# Patient Record
Sex: FEMALE | Race: WHITE | NOT HISPANIC OR LATINO | URBAN - METROPOLITAN AREA
[De-identification: names, ages, dates, MRNs, and addresses within clinical notes are randomized per-mention and may not be internally consistent; named-entity substitution may affect disease eponyms.]

---

## 2023-12-09 ENCOUNTER — HOSPITAL ENCOUNTER (EMERGENCY)
Facility: HOSPITAL | Age: 53
Discharge: HOME | End: 2023-12-09
Attending: EMERGENCY MEDICINE
Payer: COMMERCIAL

## 2023-12-09 ENCOUNTER — APPOINTMENT (EMERGENCY)
Dept: RADIOLOGY | Facility: HOSPITAL | Age: 53
End: 2023-12-09
Payer: COMMERCIAL

## 2023-12-09 VITALS
SYSTOLIC BLOOD PRESSURE: 122 MMHG | HEART RATE: 66 BPM | TEMPERATURE: 98.2 F | DIASTOLIC BLOOD PRESSURE: 60 MMHG | OXYGEN SATURATION: 100 % | WEIGHT: 147.27 LBS | RESPIRATION RATE: 20 BRPM

## 2023-12-09 DIAGNOSIS — S82.892A CLOSED FRACTURE OF LEFT ANKLE, INITIAL ENCOUNTER: Primary | ICD-10-CM

## 2023-12-09 PROCEDURE — 99284 EMERGENCY DEPT VISIT MOD MDM: CPT | Performed by: EMERGENCY MEDICINE

## 2023-12-09 PROCEDURE — 73610 X-RAY EXAM OF ANKLE: CPT

## 2023-12-09 PROCEDURE — 99283 EMERGENCY DEPT VISIT LOW MDM: CPT

## 2023-12-09 NOTE — Clinical Note
Winter Linda was seen and treated in our emergency department on 12/9/2023. No work until cleared by Family Doctor/Orthopedics        Diagnosis: Ankle Fracture    Jodi  . She may return on this date: If you have any questions or concerns, please don't hesitate to call.       Ivy Redmond, DO    ______________________________           _______________          _______________  Hospital Representative                              Date                                Time

## 2023-12-09 NOTE — ED PROVIDER NOTES
History  Chief Complaint   Patient presents with    Ankle Injury     L ankle twisted at home     Patient presents for evaluation of left ankle injury. Patient rolled her left ankle denies any other injury. History provided by:  Patient   used: No    Ankle Injury      None       History reviewed. No pertinent past medical history. Past Surgical History:   Procedure Laterality Date    OVARY SURGERY         History reviewed. No pertinent family history. I have reviewed and agree with the history as documented. E-Cigarette/Vaping    E-Cigarette Use Never User      E-Cigarette/Vaping Substances     Social History     Tobacco Use    Smoking status: Never    Smokeless tobacco: Never   Vaping Use    Vaping Use: Never used   Substance Use Topics    Alcohol use: Not Currently    Drug use: Never       Review of Systems   All other systems reviewed and are negative. Physical Exam  Physical Exam  Vitals and nursing note reviewed. Constitutional:       General: She is not in acute distress. Musculoskeletal:         General: Swelling and tenderness present. No deformity. Feet:    Skin:     Capillary Refill: Capillary refill takes less than 2 seconds. Findings: Bruising present. No rash. Neurological:      General: No focal deficit present. Mental Status: She is alert and oriented to person, place, and time.          Vital Signs  ED Triage Vitals [12/09/23 1344]   Temperature Pulse Respirations Blood Pressure SpO2   98.2 °F (36.8 °C) 66 20 122/60 100 %      Temp Source Heart Rate Source Patient Position - Orthostatic VS BP Location FiO2 (%)   Tympanic Monitor Sitting Left arm --      Pain Score       2           Vitals:    12/09/23 1344   BP: 122/60   Pulse: 66   Patient Position - Orthostatic VS: Sitting         Visual Acuity      ED Medications  Medications - No data to display    Diagnostic Studies  Results Reviewed       None                   XR ankle 3+ views LEFT (Results Pending)              Procedures  Orthopedic injury treatment    Date/Time: 12/9/2023 2:17 PM    Performed by: Kamaljit French DO  Authorized by: Kamaljit French DO  Universal Protocol:  Procedure performed by: (RN)  Consent: Verbal consent obtained. Consent given by: patient  Required items: required blood products, implants, devices, and special equipment available  Patient identity confirmed: verbally with patient and arm band    Injury location:  Ankle  Location details:  Left ankle  Injury type:  Fracture  Fracture type: lateral malleolus    Fracture type: lateral malleolus    Neurovascular status: Neurovascularly intact    Distal perfusion: normal    Neurological function: normal    Range of motion: reduced    Supplies used: Cam boot. Neurovascular status: Neurovascularly intact    Distal perfusion: normal    Neurological function: normal    Range of motion: unchanged    Patient tolerance:  Patient tolerated the procedure well with no immediate complications           ED Course                                             Medical Decision Making  Pulse ox 100% on room air indicating adequate oxygenation. Xray L ankle: Nondisplaced fracture through the lateral malleolus as read by me          Amount and/or Complexity of Data Reviewed  Radiology: ordered. Disposition  Final diagnoses:   Closed fracture of left ankle, initial encounter     Time reflects when diagnosis was documented in both MDM as applicable and the Disposition within this note       Time User Action Codes Description Comment    12/9/2023  2:10 PM Kamaljit French Add [E07.998S] Closed fracture of left ankle, initial encounter           ED Disposition       ED Disposition   Discharge    Condition   Stable    Date/Time   Sat Dec 9, 2023  2:10 PM    Comment   Gaylia Angelucci Dizon discharge to home/self care. Follow-up Information    None         There are no discharge medications for this patient.           PDMP Review None            ED Provider  Electronically Signed by             Kamaljit French DO  12/09/23 2247

## 2023-12-12 ENCOUNTER — OFFICE VISIT (OUTPATIENT)
Dept: OBGYN CLINIC | Facility: CLINIC | Age: 53
End: 2023-12-12
Payer: COMMERCIAL

## 2023-12-12 VITALS
WEIGHT: 147 LBS | HEART RATE: 64 BPM | HEIGHT: 68 IN | DIASTOLIC BLOOD PRESSURE: 73 MMHG | BODY MASS INDEX: 22.28 KG/M2 | SYSTOLIC BLOOD PRESSURE: 111 MMHG

## 2023-12-12 DIAGNOSIS — S82.832A CLOSED FRACTURE OF DISTAL END OF LEFT FIBULA, UNSPECIFIED FRACTURE MORPHOLOGY, INITIAL ENCOUNTER: ICD-10-CM

## 2023-12-12 PROCEDURE — 27786 TREATMENT OF ANKLE FRACTURE: CPT | Performed by: ORTHOPAEDIC SURGERY

## 2023-12-12 PROCEDURE — 99203 OFFICE O/P NEW LOW 30 MIN: CPT | Performed by: ORTHOPAEDIC SURGERY

## 2023-12-12 NOTE — PROGRESS NOTES
Assessment/Plan:  1. Closed fracture of distal end of left fibula, unspecified fracture morphology, initial encounter  Ambulatory Referral to Orthopedic Surgery    Fracture / Dislocation Treatment        Scribe Attestation    I,:  Stacy Heath am acting as a scribe while in the presence of the attending physician.:       I,:  Roland Caputo MD personally performed the services described in this documentation    as scribed in my presence.:           [facility] is a pleasant 46year-old female who presents for initial evaluation of the left ankle. She has a left Pelaez A fibula fracture which appears to be stable. We had a discussion about treatment options. This appears to be a stable ankle fracture. At this time we will continue with conservative nonoperative treatment with closed treatment of the distal fibula fracture. Kolton Shah She will WBAT with use of the CAM boot and crutches for added support and balance. She may wean out of the crutches as tolerated. . She should remain out of work for the next 2 weeks. A note was provided to her today. She will follow-up in 2 weeks for re-evaluation.     Fracture / Dislocation Treatment    Date/Time: 12/12/2023 1:00 PM    Performed by: Roland Caputo MD  Authorized by: Roland Caputo MD    Patient Location:  [facility]  [place] Protocol:  Timeout called at: 12/12/2023 2:00 PM.    Injury location:  Ankle  Location details:  Left ankle  Injury type:  Fracture  Fracture type: lateral malleolus    Fracture type: lateral malleolus    Neurovascular status: Neurovascularly intact    Distal perfusion: normal    Neurological function: normal    Range of motion: reduced    Manipulation performed?: No    Immobilization:  Crutches and brace  Neurovascular status: Neurovascularly intact    Distal perfusion: normal    Neurological function: normal    Range of motion: unchanged    Patient tolerance:  Patient tolerated the procedure well with no immediate complications        Subjective: Laura Gonzalez is a 46 y.o. female who presents for initial evaluation of the left ankle. She was seen in the ED on 12/9/2023 after twisting her ankle at home and heard a "pop". X-rays of the left ankle were obtained and demonstrated a distal fibular fracture. She presents to the office today using a CAM boot and crutches for ambulatory assistance. Today, she reports the ankle is feeling good. She notes the ankle is swollen and bruised. She notes the selling increased when she was icing the ankle. She has been applying compression and elevating the foot and ankle, with relief. Review of Systems   Constitutional:  Positive for activity change. Negative for chills and fever. HENT:  Negative for ear pain and sore throat. Eyes:  Negative for pain and visual disturbance. Respiratory:  Negative for cough and shortness of breath. Cardiovascular:  Negative for chest pain and palpitations. Gastrointestinal:  Negative for abdominal pain and vomiting. Genitourinary:  Negative for dysuria and hematuria. Musculoskeletal:  Positive for arthralgias, joint swelling and myalgias. Negative for back pain. Skin:  Negative for color change and rash. Neurological:  Negative for seizures and syncope. All other systems reviewed and are negative. History reviewed. No pertinent past medical history. Past Surgical History:   Procedure Laterality Date   • OVARY SURGERY         History reviewed. No pertinent family history. Social History     Occupational History   • Not on file   Tobacco Use   • Smoking status: Never   • Smokeless tobacco: Never   Vaping Use   • Vaping status: Never Used   Substance and Sexual Activity   • Alcohol use: Not Currently   • Drug use: Never   • Sexual activity: Not on file       No current outpatient medications on file.     Allergies   Allergen Reactions   • Doxycycline Hives   • Shellfish-Derived Products - Food Allergy Other (See Comments)             Objective:  Vitals: 12/12/23 1256   BP: 111/73   Pulse: 64       Left Ankle Exam     Tenderness   The patient is experiencing tenderness in the lateral malleolus. Swelling: mild    Range of Motion   Left ankle dorsiflexion: limited due to swelling. Other   Erythema: absent  Scars: absent  Sensation: normal  Pulse: present    Comments:  Significant ecchymosis          Observations   Left Ankle/Foot   Negative for adhesive scar. Physical Exam  Vitals and nursing note reviewed. Constitutional:       Appearance: Normal appearance. HENT:      Head: Normocephalic and atraumatic. Right Ear: External ear normal.      Left Ear: External ear normal.      Nose: Nose normal.   Eyes:      General: No scleral icterus. Extraocular Movements: Extraocular movements intact. Conjunctiva/sclera: Conjunctivae normal.   Cardiovascular:      Rate and Rhythm: Normal rate. Pulmonary:      Effort: Pulmonary effort is normal. No respiratory distress. Musculoskeletal:         General: Swelling and signs of injury present. Cervical back: Normal range of motion and neck supple. Comments: See ortho exam   Skin:     General: Skin is warm and dry. Findings: Bruising present. Neurological:      Mental Status: She is alert and oriented to person, place, and time. Psychiatric:         Mood and Affect: Mood normal.         Behavior: Behavior normal.         I have personally reviewed pertinent films in PACS and my interpretation is as follows:  X-rays of the left ankle obtained on 12/9/2023 demonstrate a fracture of the distal fibula, below the syndesmosis, nondisplaced. This document was created using speech voice recognition software. Grammatical errors, random word insertions, pronoun errors, and incomplete sentences are an occasional consequence of this system due to software limitations, ambient noise, and hardware issues.    Any formal questions or concerns about content, text, or information contained within the body of this dictation should be directly addressed to the provider for clarification.

## 2023-12-26 ENCOUNTER — OFFICE VISIT (OUTPATIENT)
Dept: OBGYN CLINIC | Facility: CLINIC | Age: 53
End: 2023-12-26

## 2023-12-26 ENCOUNTER — APPOINTMENT (OUTPATIENT)
Dept: RADIOLOGY | Facility: CLINIC | Age: 53
End: 2023-12-26
Payer: COMMERCIAL

## 2023-12-26 VITALS
BODY MASS INDEX: 22.28 KG/M2 | WEIGHT: 147 LBS | SYSTOLIC BLOOD PRESSURE: 115 MMHG | HEART RATE: 68 BPM | HEIGHT: 68 IN | DIASTOLIC BLOOD PRESSURE: 73 MMHG

## 2023-12-26 DIAGNOSIS — S82.832A CLOSED FRACTURE OF DISTAL END OF LEFT FIBULA, UNSPECIFIED FRACTURE MORPHOLOGY, INITIAL ENCOUNTER: ICD-10-CM

## 2023-12-26 DIAGNOSIS — S82.832A CLOSED FRACTURE OF DISTAL END OF LEFT FIBULA, UNSPECIFIED FRACTURE MORPHOLOGY, INITIAL ENCOUNTER: Primary | ICD-10-CM

## 2023-12-26 PROCEDURE — 73610 X-RAY EXAM OF ANKLE: CPT

## 2023-12-26 PROCEDURE — 99024 POSTOP FOLLOW-UP VISIT: CPT | Performed by: ORTHOPAEDIC SURGERY

## 2023-12-26 NOTE — LETTER
December 26, 2023     Patient: Jodi Salcedo  YOB: 1970  Date of Visit: 12/26/2023      To Whom it May Concern:    Jodi Salcedo is under my professional care. Jodi was seen in my office on 12/26/2023. Jodi may not participate in work for an additional 2 weeks (1/9/23). We will reevaluate in 2 weeks.     If you have any questions or concerns, please don't hesitate to call.         Sincerely,          Benjamín Ponce MD        CC: No Recipients

## 2023-12-26 NOTE — PROGRESS NOTES
"Assessment/Plan:  1. Closed fracture of distal end of left fibula, unspecified fracture morphology, initial encounter  XR ankle 3+ vw left    Ankle Cude ankle/Ankle Brace        Scribe Attestation    I,:  Greg Alonzo am acting as a scribe while in the presence of the attending physician.:       I,:  Benjamín Ponce MD personally performed the services described in this documentation    as scribed in my presence.:           Jodi is a pleasant 53-year-old female here for follow-up evaluation of left Pelaez A fibula fracture.  New x-rays were obtained and reviewed with patient at today's visit which demonstrates appropriate signs of healing, no interval displacement.  I discussed with patient that at this point time she may continue to use the cam boot and crutches and she may start to wean off of these over the next 2 weeks.  I advised patient she may start to be full weightbearing on left lower extremity with Cam boot.  Lace up ankle brace was given to patient today for her to use 2 weeks after cam boot usage.  I educated patient that she may remove cam boot for hygiene and sleep.  She should remain out of work for the next 2 weeks.  A note was provided to her today.  She will follow-up in 2 weeks for reevaluation.      Subjective:   Jodi Salcedo is a 53 y.o. female who presents for follow-up evaluation left Pelaez A fibula fracture.  Patient was last seen 12/12/2023 where patient was to be weightbearing as tolerated with use of cam boot and crutches.  Patient states today she continues to get better and her swelling has decreased.  However patient states she occasionally gets pain with certain movements and has been using the crutches for ambulatory assistance.  Patient states she has continued been compliant with cam boot and crutches since last visit.  Patient is overall pleased with her progress.    Previous history: She was seen in the ED on 12/9/2023 after twisting her ankle at home and heard a \"pop\". " X-rays of the left ankle were obtained and demonstrated a distal fibular fracture. She presents to the office today using a CAM boot and crutches for ambulatory assistance. She reports the ankle is feeling good. She notes the ankle is swollen and bruised. She notes the selling increased when she was icing the ankle. She has been applying compression and elevating the foot and ankle, with relief.       Review of Systems   Constitutional:  Positive for activity change. Negative for chills, fever and unexpected weight change.   HENT:  Negative for hearing loss, nosebleeds and sore throat.    Eyes:  Negative for pain, redness and visual disturbance.   Respiratory:  Negative for cough, shortness of breath and wheezing.    Cardiovascular:  Negative for chest pain, palpitations and leg swelling.   Gastrointestinal:  Negative for abdominal pain, nausea and vomiting.   Endocrine: Negative for polydipsia and polyuria.   Genitourinary:  Negative for dysuria and hematuria.   Musculoskeletal:  Positive for arthralgias.        See HPI   Skin:  Negative for rash and wound.   Neurological:  Negative for dizziness, numbness and headaches.   Psychiatric/Behavioral:  Negative for decreased concentration and suicidal ideas. The patient is not nervous/anxious.          History reviewed. No pertinent past medical history.    Past Surgical History:   Procedure Laterality Date   • OVARY SURGERY         History reviewed. No pertinent family history.    Social History     Occupational History   • Not on file   Tobacco Use   • Smoking status: Never   • Smokeless tobacco: Never   Vaping Use   • Vaping status: Never Used   Substance and Sexual Activity   • Alcohol use: Not Currently   • Drug use: Never   • Sexual activity: Not on file       No current outpatient medications on file.    Allergies   Allergen Reactions   • Doxycycline Hives   • Shellfish-Derived Products - Food Allergy Other (See Comments)             Objective:  Vitals:     12/26/23 1259   BP: 115/73   Pulse: 68       Left Ankle Exam     Tenderness   The patient is experiencing tenderness in the lateral malleolus.   Swelling: mild    Other   Erythema: absent  Scars: absent  Sensation: normal  Pulse: present    Comments:  Mild ecchymosis          Observations   Left Ankle/Foot   Negative for adhesive scar.       Physical Exam  Vitals and nursing note reviewed.   Constitutional:       Appearance: Normal appearance. She is well-developed.   HENT:      Head: Normocephalic and atraumatic.      Right Ear: External ear normal.      Left Ear: External ear normal.   Eyes:      General: No scleral icterus.     Extraocular Movements: Extraocular movements intact.      Conjunctiva/sclera: Conjunctivae normal.   Cardiovascular:      Rate and Rhythm: Normal rate.   Pulmonary:      Effort: Pulmonary effort is normal. No respiratory distress.   Musculoskeletal:      Cervical back: Normal range of motion and neck supple.      Comments: See Ortho exam   Skin:     General: Skin is warm and dry.   Neurological:      General: No focal deficit present.      Mental Status: She is alert and oriented to person, place, and time.   Psychiatric:         Behavior: Behavior normal.         I have personally reviewed pertinent films in PACS and my interpretation is as follows:  X-rays of the left ankle obtained on 12/26/2023 demonstrate a fracture of the distal fibula, below the syndesmosis, nondisplaced with appropriate healing.      This document was created using speech voice recognition software.   Grammatical errors, random word insertions, pronoun errors, and incomplete sentences are an occasional consequence of this system due to software limitations, ambient noise, and hardware issues.   Any formal questions or concerns about content, text, or information contained within the body of this dictation should be directly addressed to the provider for clarification.

## 2023-12-26 NOTE — LETTER
December 26, 2023     Patient: Jodi Salcedo  YOB: 1970  Date of Visit: 12/26/2023      To Whom it May Concern:    Jodi Salcedo is under my professional care. Jodi was seen in my office on 12/26/2023. Jodi may not participate in work for an additional 2 weeks (1/9/24). We will reevaluate in 2 weeks.     If you have any questions or concerns, please don't hesitate to call.         Sincerely,          Benjamín Ponce MD        CC: No Recipients

## 2024-01-09 ENCOUNTER — APPOINTMENT (OUTPATIENT)
Dept: RADIOLOGY | Facility: CLINIC | Age: 54
End: 2024-01-09
Payer: COMMERCIAL

## 2024-01-09 ENCOUNTER — OFFICE VISIT (OUTPATIENT)
Dept: OBGYN CLINIC | Facility: CLINIC | Age: 54
End: 2024-01-09

## 2024-01-09 VITALS
BODY MASS INDEX: 22.28 KG/M2 | HEART RATE: 67 BPM | SYSTOLIC BLOOD PRESSURE: 124 MMHG | WEIGHT: 147 LBS | DIASTOLIC BLOOD PRESSURE: 79 MMHG | HEIGHT: 68 IN

## 2024-01-09 DIAGNOSIS — S82.832A CLOSED FRACTURE OF DISTAL END OF LEFT FIBULA, UNSPECIFIED FRACTURE MORPHOLOGY, INITIAL ENCOUNTER: ICD-10-CM

## 2024-01-09 PROCEDURE — 73610 X-RAY EXAM OF ANKLE: CPT

## 2024-01-09 PROCEDURE — 99024 POSTOP FOLLOW-UP VISIT: CPT | Performed by: ORTHOPAEDIC SURGERY

## 2024-01-09 NOTE — LETTER
January 9, 2024     Patient: Jodi Salcedo  YOB: 1970  Date of Visit: 1/9/2024      To Whom it May Concern:    Jodi Salcedo is under my professional care. Jodi was seen in my office on 1/9/2024. Jodi can return to work on 1/22/2024.    If you have any questions or concerns, please don't hesitate to call.         Sincerely,          Benjamín Ponce MD        CC: No Recipients

## 2024-01-10 NOTE — PROGRESS NOTES
"Assessment/Plan:  1. Closed fracture of distal end of left fibula, unspecified fracture morphology, initial encounter  XR ankle 3+ vw left        Scribe Attestation    I,:   am acting as a scribe while in the presence of the attending physician.:       I,:   personally performed the services described in this documentation    as scribed in my presence.:           Jodi is a pleasant 53-year-old female here for follow-up evaluation of left Pelaez A fibula fracture.  New x-rays were obtained and reviewed with patient at today's visit which demonstrates appropriate signs of healing, no interval displacement.  She is now 4 weeks out from her injury.  She has transition to a shoe and ankle brace.  She still has a limp.  Will plan to see her back in about 2 weeks time.  She should remain out of work at this time.  Reevaluate in 2 weeks.  Continue to weight-bear as tolerated.      Subjective:   Today 1/9/2024: Her pain is well-controlled.  She is transition to a regular shoe and ankle brace.  She is still walking with a slight limp.    12/26/23: Jodi Salcedo is a 53 y.o. female who presents for follow-up evaluation left Pelaez A fibula fracture.  Patient was last seen 12/12/2023 where patient was to be weightbearing as tolerated with use of cam boot and crutches.  Patient states today she continues to get better and her swelling has decreased.  However patient states she occasionally gets pain with certain movements and has been using the crutches for ambulatory assistance.  Patient states she has continued been compliant with cam boot and crutches since last visit.  Patient is overall pleased with her progress.    Previous history: She was seen in the ED on 12/9/2023 after twisting her ankle at home and heard a \"pop\". X-rays of the left ankle were obtained and demonstrated a distal fibular fracture. She presents to the office today using a CAM boot and crutches for ambulatory assistance. She reports the ankle is feeling " good. She notes the ankle is swollen and bruised. She notes the selling increased when she was icing the ankle. She has been applying compression and elevating the foot and ankle, with relief.       Review of Systems   Constitutional:  Positive for activity change. Negative for chills, fever and unexpected weight change.   HENT:  Negative for hearing loss, nosebleeds and sore throat.    Eyes:  Negative for pain, redness and visual disturbance.   Respiratory:  Negative for cough, shortness of breath and wheezing.    Cardiovascular:  Negative for chest pain, palpitations and leg swelling.   Gastrointestinal:  Negative for abdominal pain, nausea and vomiting.   Endocrine: Negative for polydipsia and polyuria.   Genitourinary:  Negative for dysuria and hematuria.   Musculoskeletal:  Positive for arthralgias.        See HPI   Skin:  Negative for rash and wound.   Neurological:  Negative for dizziness, numbness and headaches.   Psychiatric/Behavioral:  Negative for decreased concentration and suicidal ideas. The patient is not nervous/anxious.          History reviewed. No pertinent past medical history.    Past Surgical History:   Procedure Laterality Date   • OVARY SURGERY         History reviewed. No pertinent family history.    Social History     Occupational History   • Not on file   Tobacco Use   • Smoking status: Never   • Smokeless tobacco: Never   Vaping Use   • Vaping status: Never Used   Substance and Sexual Activity   • Alcohol use: Not Currently   • Drug use: Never   • Sexual activity: Not on file       No current outpatient medications on file.    Allergies   Allergen Reactions   • Doxycycline Hives   • Shellfish-Derived Products - Food Allergy Other (See Comments)             Objective:  Vitals:    01/09/24 1033   BP: 124/79   Pulse: 67       Left Ankle Exam     Tenderness   The patient is experiencing tenderness in the lateral malleolus.   Swelling: mild    Other   Erythema: absent  Scars:  absent  Sensation: normal  Pulse: present    Comments:  Mild ecchymosis          Observations   Left Ankle/Foot   Negative for adhesive scar.       Physical Exam  Vitals and nursing note reviewed.   Constitutional:       Appearance: Normal appearance. She is well-developed.   HENT:      Head: Normocephalic and atraumatic.      Right Ear: External ear normal.      Left Ear: External ear normal.   Eyes:      General: No scleral icterus.     Extraocular Movements: Extraocular movements intact.      Conjunctiva/sclera: Conjunctivae normal.   Cardiovascular:      Rate and Rhythm: Normal rate.   Pulmonary:      Effort: Pulmonary effort is normal. No respiratory distress.   Musculoskeletal:      Cervical back: Normal range of motion and neck supple.      Comments: See Ortho exam   Skin:     General: Skin is warm and dry.   Neurological:      General: No focal deficit present.      Mental Status: She is alert and oriented to person, place, and time.   Psychiatric:         Behavior: Behavior normal.         I have personally reviewed pertinent films in PACS and my interpretation is as follows:  X-rays of the left ankle obtained on 1/9/2024 demonstrate a fracture of the distal fibula, below the syndesmosis, nondisplaced interval signs of healing since last visit      This document was created using speech voice recognition software.   Grammatical errors, random word insertions, pronoun errors, and incomplete sentences are an occasional consequence of this system due to software limitations, ambient noise, and hardware issues.   Any formal questions or concerns about content, text, or information contained within the body of this dictation should be directly addressed to the provider for clarification.

## 2024-01-16 ENCOUNTER — TELEPHONE (OUTPATIENT)
Age: 54
End: 2024-01-16

## 2024-01-16 NOTE — TELEPHONE ENCOUNTER
Caller: Brayan    Doctor/Office: Rosario    CB#: [phone]    Work or school note: The need an updated return to work note that states patient is able to return to work with no restrictions full duty     Return to work/school date:  1/22/2024    Fax #: [phone]    Is there any restrictions that need to be updated? If so, what? NONE

## 2024-04-20 ENCOUNTER — OFFICE VISIT (OUTPATIENT)
Dept: URGENT CARE | Facility: CLINIC | Age: 54
End: 2024-04-20
Payer: COMMERCIAL

## 2024-04-20 VITALS
TEMPERATURE: 98.2 F | SYSTOLIC BLOOD PRESSURE: 119 MMHG | HEART RATE: 71 BPM | OXYGEN SATURATION: 98 % | RESPIRATION RATE: 16 BRPM | BODY MASS INDEX: 23.26 KG/M2 | DIASTOLIC BLOOD PRESSURE: 73 MMHG | WEIGHT: 153 LBS

## 2024-04-20 DIAGNOSIS — J06.9 VIRAL URI: Primary | ICD-10-CM

## 2024-04-20 PROCEDURE — 99213 OFFICE O/P EST LOW 20 MIN: CPT | Performed by: FAMILY MEDICINE

## 2024-04-20 RX ORDER — BROMPHENIRAMINE MALEATE, PSEUDOEPHEDRINE HYDROCHLORIDE, AND DEXTROMETHORPHAN HYDROBROMIDE 2; 30; 10 MG/5ML; MG/5ML; MG/5ML
10 SYRUP ORAL 3 TIMES DAILY PRN
Qty: 200 ML | Refills: 0 | Status: SHIPPED | OUTPATIENT
Start: 2024-04-20

## 2024-04-20 NOTE — PROGRESS NOTES
[facility]        NAME: Jodi Salcedo is a 53 y.o. female  : 1970    MRN: [id number]  DATE: 2024  TIME: 11:36 AM    Assessment and Plan   Viral URI [J06.9]  1. Viral URI  brompheniramine-pseudoephedrine-DM 30-2-10 MG/5ML syrup            Patient Instructions     Decongestants given for congestion. No signs of bacterial infection today. Follow up with PCP in 3-5 days if no improvement. Proceed to ER if symptoms worsen.    Chief Complaint     Chief Complaint   Patient presents with   • Facial Pain     Started about 2 weeks ago, getting worse.          History of Present Illness     Jodi Salcedo is a 53 y.o. female presenting to the office today for upper respiratory complaints. Symptoms have been present for 14 days, and include sinus pressure. She denies fevers or chills. She has tried Mucinex for her symptoms, with no relief.  Sick contacts include: NA      Review of Systems     Review of Systems   Constitutional:  Negative for chills, fatigue and fever.   HENT:  Positive for congestion, sinus pressure and sore throat. Negative for ear discharge, ear pain, postnasal drip and sinus pain.    Eyes:  Negative for pain and discharge.   Respiratory:  Negative for cough and shortness of breath.    Cardiovascular:  Negative for chest pain and palpitations.   Gastrointestinal:  Negative for abdominal pain, diarrhea, nausea and vomiting.   Genitourinary:  Negative for difficulty urinating and dysuria.   Musculoskeletal:  Negative for arthralgias and myalgias.   Skin:  Negative for rash.   Neurological:  Negative for dizziness, syncope, light-headedness, numbness and headaches.   Psychiatric/Behavioral:  Negative for agitation.    All other systems reviewed and are negative.      Current Medications       Current Outpatient Medications:   •  brompheniramine-pseudoephedrine-DM 30-2-10 MG/5ML syrup, Take 10 mL by mouth 3 (three) times a day as needed for cough or congestion, Disp: 200 mL, Rfl:  0    Current Allergies     Allergies as of 04/20/2024 - Reviewed 04/20/2024   Allergen Reaction Noted   • Doxycycline Hives 06/12/2022   • Shellfish-derived products - food allergy Other (See Comments) 06/12/2022            The following portions of the patient's history were reviewed and updated as appropriate: allergies, current medications, past family history, past medical history, past social history, past surgical history and problem list.     History reviewed. No pertinent past medical history.    Past Surgical History:   Procedure Laterality Date   • OVARY SURGERY         History reviewed. No pertinent family history.    Medications have been verified.    Objective     /73   Pulse 71   Temp 98.2 °F (36.8 °C)   Resp 16   Wt 69.4 kg (153 lb)   SpO2 98%   BMI 23.26 kg/m²   No LMP recorded. Patient is postmenopausal.     Physical Exam     Physical Exam  Vitals reviewed.   Constitutional:       General: She is not in acute distress.     Appearance: Normal appearance. She is not ill-appearing.   HENT:      Head: Normocephalic and atraumatic.      Right Ear: Tympanic membrane and ear canal normal. No middle ear effusion.      Left Ear: Tympanic membrane and ear canal normal.  No middle ear effusion.      Mouth/Throat:      Mouth: Mucous membranes are moist.      Pharynx: Posterior oropharyngeal erythema present. No oropharyngeal exudate.      Tonsils: No tonsillar exudate.   Eyes:      Extraocular Movements: Extraocular movements intact.      Conjunctiva/sclera: Conjunctivae normal.      Pupils: Pupils are equal, round, and reactive to light.   Cardiovascular:      Rate and Rhythm: Normal rate and regular rhythm.      Pulses: Normal pulses.      Heart sounds: Normal heart sounds. No murmur heard.  Pulmonary:      Effort: Pulmonary effort is normal. No respiratory distress.      Breath sounds: Normal breath sounds. No wheezing.   Musculoskeletal:      Cervical back: Normal range of motion and neck  supple. No tenderness.   Skin:     General: Skin is warm.   Neurological:      General: No focal deficit present.      Mental Status: She is alert.   Psychiatric:         Mood and Affect: Mood normal.         Behavior: Behavior normal.         Judgment: Judgment normal.

## 2025-03-02 ENCOUNTER — APPOINTMENT (EMERGENCY)
Dept: RADIOLOGY | Facility: HOSPITAL | Age: 55
End: 2025-03-02
Payer: COMMERCIAL

## 2025-03-02 ENCOUNTER — HOSPITAL ENCOUNTER (EMERGENCY)
Facility: HOSPITAL | Age: 55
Discharge: HOME | End: 2025-03-02
Attending: EMERGENCY MEDICINE
Payer: COMMERCIAL

## 2025-03-02 VITALS
HEART RATE: 65 BPM | BODY MASS INDEX: 21.74 KG/M2 | OXYGEN SATURATION: 98 % | DIASTOLIC BLOOD PRESSURE: 70 MMHG | SYSTOLIC BLOOD PRESSURE: 131 MMHG | TEMPERATURE: 97.2 F | RESPIRATION RATE: 20 BRPM | WEIGHT: 143 LBS

## 2025-03-02 DIAGNOSIS — R10.9 ABDOMINAL PAIN: Primary | ICD-10-CM

## 2025-03-02 LAB
ALBUMIN SERPL BCG-MCNC: 4.2 G/DL (ref 3.5–5)
ALP SERPL-CCNC: 72 U/L (ref 34–104)
ALT SERPL W P-5'-P-CCNC: 15 U/L (ref 7–52)
ANION GAP SERPL CALCULATED.3IONS-SCNC: 10 MMOL/L (ref 4–13)
AST SERPL W P-5'-P-CCNC: 19 U/L (ref 13–39)
BASOPHILS # BLD AUTO: 0.04 THOUSANDS/ÂΜL (ref 0–0.1)
BASOPHILS NFR BLD AUTO: 1 % (ref 0–1)
BILIRUB SERPL-MCNC: 0.37 MG/DL (ref 0.2–1)
BILIRUB UR QL STRIP: NEGATIVE
BUN SERPL-MCNC: 6 MG/DL (ref 5–25)
CALCIUM SERPL-MCNC: 9.1 MG/DL (ref 8.4–10.2)
CARDIAC TROPONIN I PNL SERPL HS: <2 NG/L (ref ?–50)
CHLORIDE SERPL-SCNC: 101 MMOL/L (ref 96–108)
CLARITY UR: CLEAR
CO2 SERPL-SCNC: 23 MMOL/L (ref 21–32)
COLOR UR: COLORLESS
CREAT SERPL-MCNC: 0.61 MG/DL (ref 0.6–1.3)
EOSINOPHIL # BLD AUTO: 0.02 THOUSAND/ÂΜL (ref 0–0.61)
EOSINOPHIL NFR BLD AUTO: 0 % (ref 0–6)
ERYTHROCYTE [DISTWIDTH] IN BLOOD BY AUTOMATED COUNT: 12.4 % (ref 11.6–15.1)
GFR SERPL CREATININE-BSD FRML MDRD: 102 ML/MIN/1.73SQ M
GLUCOSE SERPL-MCNC: 86 MG/DL (ref 65–140)
GLUCOSE UR STRIP-MCNC: NEGATIVE MG/DL
HCT VFR BLD AUTO: 42.1 % (ref 34.8–46.1)
HGB BLD-MCNC: 13.4 G/DL (ref 11.5–15.4)
HGB UR QL STRIP.AUTO: NEGATIVE
IMM GRANULOCYTES # BLD AUTO: 0.02 THOUSAND/UL (ref 0–0.2)
IMM GRANULOCYTES NFR BLD AUTO: 0 % (ref 0–2)
KETONES UR STRIP-MCNC: NEGATIVE MG/DL
LEUKOCYTE ESTERASE UR QL STRIP: NEGATIVE
LIPASE SERPL-CCNC: 19 U/L (ref 11–82)
LYMPHOCYTES # BLD AUTO: 1.49 THOUSANDS/ÂΜL (ref 0.6–4.47)
LYMPHOCYTES NFR BLD AUTO: 19 % (ref 14–44)
MCH RBC QN AUTO: 28.2 PG (ref 26.8–34.3)
MCHC RBC AUTO-ENTMCNC: 31.8 G/DL (ref 31.4–37.4)
MCV RBC AUTO: 89 FL (ref 82–98)
MONOCYTES # BLD AUTO: 0.34 THOUSAND/ÂΜL (ref 0.17–1.22)
MONOCYTES NFR BLD AUTO: 4 % (ref 4–12)
NEUTROPHILS # BLD AUTO: 6.16 THOUSANDS/ÂΜL (ref 1.85–7.62)
NEUTS SEG NFR BLD AUTO: 76 % (ref 43–75)
NITRITE UR QL STRIP: NEGATIVE
NRBC BLD AUTO-RTO: 0 /100 WBCS
PH UR STRIP.AUTO: 6.5 [PH]
PLATELET # BLD AUTO: 294 THOUSANDS/UL (ref 149–390)
PMV BLD AUTO: 8.7 FL (ref 8.9–12.7)
POTASSIUM SERPL-SCNC: 3.9 MMOL/L (ref 3.5–5.3)
PROT SERPL-MCNC: 6.6 G/DL (ref 6.4–8.4)
PROT UR STRIP-MCNC: NEGATIVE MG/DL
RBC # BLD AUTO: 4.75 MILLION/UL (ref 3.81–5.12)
SODIUM SERPL-SCNC: 134 MMOL/L (ref 135–147)
SP GR UR STRIP.AUTO: <1.005 (ref 1–1.03)
UROBILINOGEN UR STRIP-ACNC: <2 MG/DL
WBC # BLD AUTO: 8.07 THOUSAND/UL (ref 4.31–10.16)

## 2025-03-02 PROCEDURE — 85025 COMPLETE CBC W/AUTO DIFF WBC: CPT | Performed by: EMERGENCY MEDICINE

## 2025-03-02 PROCEDURE — 83690 ASSAY OF LIPASE: CPT | Performed by: EMERGENCY MEDICINE

## 2025-03-02 PROCEDURE — 84484 ASSAY OF TROPONIN QUANT: CPT | Performed by: EMERGENCY MEDICINE

## 2025-03-02 PROCEDURE — 99285 EMERGENCY DEPT VISIT HI MDM: CPT | Performed by: EMERGENCY MEDICINE

## 2025-03-02 PROCEDURE — 80053 COMPREHEN METABOLIC PANEL: CPT | Performed by: EMERGENCY MEDICINE

## 2025-03-02 PROCEDURE — 93005 ELECTROCARDIOGRAM TRACING: CPT

## 2025-03-02 PROCEDURE — 99285 EMERGENCY DEPT VISIT HI MDM: CPT

## 2025-03-02 PROCEDURE — 74177 CT ABD & PELVIS W/CONTRAST: CPT

## 2025-03-02 PROCEDURE — 36415 COLL VENOUS BLD VENIPUNCTURE: CPT | Performed by: EMERGENCY MEDICINE

## 2025-03-02 PROCEDURE — 81003 URINALYSIS AUTO W/O SCOPE: CPT | Performed by: EMERGENCY MEDICINE

## 2025-03-02 RX ORDER — DICYCLOMINE HYDROCHLORIDE 10 MG/1
20 CAPSULE ORAL ONCE
Status: COMPLETED | OUTPATIENT
Start: 2025-03-02 | End: 2025-03-02

## 2025-03-02 RX ORDER — PANTOPRAZOLE SODIUM 20 MG/1
20 TABLET, DELAYED RELEASE ORAL
Status: DISCONTINUED | OUTPATIENT
Start: 2025-03-03 | End: 2025-03-02

## 2025-03-02 RX ORDER — DICYCLOMINE HCL 20 MG
20 TABLET ORAL EVERY 6 HOURS PRN
Qty: 20 TABLET | Refills: 0 | Status: SHIPPED | OUTPATIENT
Start: 2025-03-02

## 2025-03-02 RX ORDER — PANTOPRAZOLE SODIUM 20 MG/1
20 TABLET, DELAYED RELEASE ORAL ONCE
Status: COMPLETED | OUTPATIENT
Start: 2025-03-02 | End: 2025-03-02

## 2025-03-02 RX ORDER — OMEPRAZOLE 20 MG/1
20 CAPSULE, DELAYED RELEASE ORAL DAILY
Qty: 20 CAPSULE | Refills: 0 | Status: SHIPPED | OUTPATIENT
Start: 2025-03-02

## 2025-03-02 RX ADMIN — DICYCLOMINE HYDROCHLORIDE 20 MG: 10 CAPSULE ORAL at 17:01

## 2025-03-02 RX ADMIN — PANTOPRAZOLE SODIUM 20 MG: 20 TABLET, DELAYED RELEASE ORAL at 17:01

## 2025-03-02 RX ADMIN — IOHEXOL 100 ML: 350 INJECTION, SOLUTION INTRAVENOUS at 15:17

## 2025-03-02 NOTE — DISCHARGE INSTRUCTIONS
All your tests were normal at this time.  Often you need further testing beyond what we can do in the ER.  Follow up with your GI doctor.  In the meantime, try the medicines as prescribed.  You can also try tylenol, advil, Maalox, heating pad, rest as needed for discomfort.

## 2025-03-02 NOTE — ED PROVIDER NOTES
Time reflects when diagnosis was documented in both MDM as applicable and the Disposition within this note       Time User Action Codes Description Comment    3/2/2025  4:21 PM Augustina Zamudio Add [R10.9] Abdominal pain           ED Disposition       ED Disposition   Discharge    Condition   Stable    Date/Time   Sun Mar 2, 2025  4:21 PM    Comment   Jodi Salcedo discharge to home/self care.                   Assessment & Plan       Medical Decision Making  Differential includes but not limited to diverticulitis, colitis, pancreatitis, UTI, kidney stone, irritable bowel.  Will do screening blood work and CT scan.   1500- blood work all normal, UA negative.  CT pending.    CT negative for acute process.  Pt. Advised.  Her  says she is under a lot of stress currently.  I will try course of Omeprazole and Bentyl and advised follow up outpt. With GI.  Pt. Agreeable with plan.    Amount and/or Complexity of Data Reviewed  Labs: ordered.  Radiology: ordered.    Risk  Prescription drug management.             Medications   iohexol (OMNIPAQUE) 350 MG/ML injection (MULTI-DOSE) 100 mL (100 mL Intravenous Given 3/2/25 1517)   dicyclomine (BENTYL) capsule 20 mg (20 mg Oral Given 3/2/25 1701)   pantoprazole (PROTONIX) EC tablet 20 mg (20 mg Oral Given 3/2/25 1701)       ED Risk Strat Scores                            SBIRT 20yo+      Flowsheet Row Most Recent Value   Initial Alcohol Screen: US AUDIT-C     1. How often do you have a drink containing alcohol? 0 Filed at: 03/02/2025 1518   2. How many drinks containing alcohol do you have on a typical day you are drinking?  0 Filed at: 03/02/2025 1518   3a. Male UNDER 65: How often do you have five or more drinks on one occasion? 0 Filed at: 03/02/2025 1518   3b. FEMALE Any Age, or MALE 65+: How often do you have 4 or more drinks on one occassion? 0 Filed at: 03/02/2025 1518   Audit-C Score 0 Filed at: 03/02/2025 1518   MARSHA: How many times in the past year have you...     Used an illegal drug or used a prescription medication for non-medical reasons? Never Filed at: 03/02/2025 0028                            History of Present Illness       Chief Complaint   Patient presents with    Abdominal Pain     Started with intermittent  abd pain yesterday, becoming more frequent. Now sometimes traveling to chest. C/o headache and congestion as well       History reviewed. No pertinent past medical history.   Past Surgical History:   Procedure Laterality Date    OVARY SURGERY        History reviewed. No pertinent family history.   Social History     Tobacco Use    Smoking status: Never    Smokeless tobacco: Never   Vaping Use    Vaping status: Never Used   Substance Use Topics    Alcohol use: Not Currently    Drug use: Never      E-Cigarette/Vaping    E-Cigarette Use Never User       E-Cigarette/Vaping Substances      I have reviewed and agree with the history as documented.     55 yo female c/o left sided abdominal worsening over the past 2 days.  Pain is off and on.  Has noted some left ear pain as well and had left sided chest pain today about 3 hours ago.  The chest pain occurred while walking and lasted about 15 minutes and went away.  She denies N/V/D, constipation, sob, fever, cough, cold symptoms.  She has h/o ovarian surgery in the past.      History provided by:  Patient   used: No    Abdominal Pain  Associated symptoms: chest pain    Associated symptoms: no cough, no diarrhea, no dysuria, no fever, no nausea, no shortness of breath and no vomiting        Review of Systems   Constitutional: Negative.  Negative for fever.   HENT:  Positive for ear pain.    Eyes: Negative.    Respiratory: Negative.  Negative for cough and shortness of breath.    Cardiovascular:  Positive for chest pain.   Gastrointestinal:  Positive for abdominal pain. Negative for diarrhea, nausea and vomiting.   Genitourinary: Negative.  Negative for dysuria and flank pain.   Musculoskeletal:  Negative.  Negative for back pain and myalgias.   Skin: Negative.  Negative for rash.   Neurological: Negative.  Negative for dizziness and headaches.   Hematological:  Does not bruise/bleed easily.   Psychiatric/Behavioral: Negative.     All other systems reviewed and are negative.          Objective       ED Triage Vitals   Temperature Pulse Blood Pressure Respirations SpO2 Patient Position - Orthostatic VS   03/02/25 1319 03/02/25 1319 03/02/25 1319 03/02/25 1319 03/02/25 1319 03/02/25 1319   (!) 97.2 °F (36.2 °C) 73 153/70 (!) 24 99 % Sitting      Temp Source Heart Rate Source BP Location FiO2 (%) Pain Score    03/02/25 1319 03/02/25 1537 03/02/25 1319 -- 03/02/25 1319    Tympanic Monitor Right arm  5      Vitals      Date and Time Temp Pulse SpO2 Resp BP Pain Score FACES Pain Rating User   03/02/25 1705 -- 65 98 % 20 131/70 -- -- CS   03/02/25 1537 -- 71 99 % 20 123/65 -- -- CS   03/02/25 1319 97.2 °F (36.2 °C) 73 99 % 24 153/70 5 -- LS            Physical Exam  Vitals and nursing note reviewed.   Constitutional:       General: She is not in acute distress.     Appearance: She is not ill-appearing or toxic-appearing.   HENT:      Head: Normocephalic and atraumatic.      Mouth/Throat:      Mouth: Mucous membranes are moist.      Pharynx: Oropharynx is clear. No oropharyngeal exudate.   Eyes:      General: No scleral icterus.     Extraocular Movements: Extraocular movements intact.   Cardiovascular:      Rate and Rhythm: Normal rate and regular rhythm.      Heart sounds: Normal heart sounds.   Pulmonary:      Effort: Pulmonary effort is normal. No respiratory distress.      Breath sounds: Normal breath sounds.   Abdominal:      General: Bowel sounds are normal. There is no distension.      Palpations: Abdomen is soft.      Tenderness: There is abdominal tenderness in the epigastric area, periumbilical area and left lower quadrant. There is no right CVA tenderness or left CVA tenderness.   Musculoskeletal:          General: No deformity. Normal range of motion.      Right lower leg: No edema.      Left lower leg: No edema.   Skin:     General: Skin is warm and dry.   Neurological:      General: No focal deficit present.      Mental Status: She is alert and oriented to person, place, and time.   Psychiatric:         Mood and Affect: Mood normal.         Behavior: Behavior normal.         Results Reviewed       Procedure Component Value Units Date/Time    HS Troponin 0hr (reflex protocol) [[id number]]  (Normal) Collected: 03/02/25 1418    Lab Status: Final result Specimen: Blood from Arm, Left Updated: 03/02/25 1447     hs TnI 0hr <2 ng/L     UA (URINE) with reflex to Scope [[id number]]  (Abnormal) Collected: 03/02/25 1437    Lab Status: Final result Specimen: Urine, Clean Catch Updated: 03/02/25 1443     Color, UA Colorless     Clarity, UA Clear     Specific Gravity, UA <1.005     pH, UA 6.5     Leukocytes, UA Negative     Nitrite, UA Negative     Protein, UA Negative mg/dl      Glucose, UA Negative mg/dl      Ketones, UA Negative mg/dl      Urobilinogen, UA <2.0 mg/dl      Bilirubin, UA Negative     Occult Blood, UA Negative    Comprehensive metabolic panel [[id number]]  (Abnormal) Collected: 03/02/25 1418    Lab Status: Final result Specimen: Blood from Arm, Left Updated: 03/02/25 1440     Sodium 134 mmol/L      Potassium 3.9 mmol/L      Chloride 101 mmol/L      CO2 23 mmol/L      ANION GAP 10 mmol/L      BUN 6 mg/dL      Creatinine 0.61 mg/dL      Glucose 86 mg/dL      Calcium 9.1 mg/dL      AST 19 U/L      ALT 15 U/L      Alkaline Phosphatase 72 U/L      Total Protein 6.6 g/dL      Albumin 4.2 g/dL      Total Bilirubin 0.37 mg/dL      eGFR 102 ml/min/1.73sq m     Narrative:      [employer] guidelines for Chronic Kidney Disease (CKD):     Stage 1 with normal or high GFR (GFR > 90 mL/min/1.73 square meters)    Stage 2 Mild CKD (GFR = 60-89 mL/min/1.73 square meters)    Stage 3A Moderate CKD (GFR = 45-59  mL/min/1.73 square meters)    Stage 3B Moderate CKD (GFR = 30-44 mL/min/1.73 square meters)    Stage 4 Severe CKD (GFR = 15-29 mL/min/1.73 square meters)    Stage 5 End Stage CKD (GFR <15 mL/min/1.73 square meters)  Note: GFR calculation is accurate only with a steady state creatinine    Lipase [[id number]]  (Normal) Collected: 03/02/25 1418    Lab Status: Final result Specimen: Blood from Arm, Left Updated: 03/02/25 1440     Lipase 19 u/L     CBC and differential [[id number]]  (Abnormal) Collected: 03/02/25 1418    Lab Status: Final result Specimen: Blood from Arm, Left Updated: 03/02/25 1423     WBC 8.07 Thousand/uL      RBC 4.75 Million/uL      Hemoglobin 13.4 g/dL      Hematocrit 42.1 %      MCV 89 fL      MCH 28.2 pg      MCHC 31.8 g/dL      RDW 12.4 %      MPV 8.7 fL      Platelets 294 Thousands/uL      nRBC 0 /100 WBCs      Segmented % 76 %      Immature Grans % 0 %      Lymphocytes % 19 %      Monocytes % 4 %      Eosinophils Relative 0 %      Basophils Relative 1 %      Absolute Neutrophils 6.16 Thousands/µL      Absolute Immature Grans 0.02 Thousand/uL      Absolute Lymphocytes 1.49 Thousands/µL      Absolute Monocytes 0.34 Thousand/µL      Eosinophils Absolute 0.02 Thousand/µL      Basophils Absolute 0.04 Thousands/µL             CT abdomen pelvis with contrast   Final Interpretation by Jose Linton MD (03/02 1544)      No acute findings in the abdomen or pelvis.         Workstation performed: [id number]             ECG 12 Lead Documentation Only    Date/Time: 3/2/2025 1:31 PM    Performed by: Augustina Zamudio MD  Authorized by: Augustina Zamudio MD    Indications / Diagnosis:  Chest/abdominal pain  ECG reviewed by me, the ED Provider: yes    Patient location:  ED  Previous ECG:     Previous ECG:  Unavailable  Interpretation:     Interpretation: normal    Rate:     ECG rate:  61    ECG rate assessment: normal    Rhythm:     Rhythm: sinus rhythm    Ectopy:     Ectopy: none    QRS:     QRS axis:   Normal  Conduction:     Conduction: normal    ST segments:     ST segments:  Normal  T waves:     T waves: normal        ED Medication and Procedure Management   Prior to Admission Medications   Prescriptions Last Dose Informant Patient Reported? Taking?   brompheniramine-pseudoephedrine-DM 30-2-10 MG/5ML syrup Not Taking  No No   Sig: Take 10 mL by mouth 3 (three) times a day as needed for cough or congestion   Patient not taking: Reported on 3/2/2025      Facility-Administered Medications: None     Discharge Medication List as of 3/2/2025  4:23 PM        START taking these medications    Details   dicyclomine (BENTYL) 20 mg tablet Take 1 tablet (20 mg total) by mouth every 6 (six) hours as needed (abdominal discomfort), Starting Sun 3/2/2025, Normal      omeprazole (PriLOSEC) 20 mg delayed release capsule Take 1 capsule (20 mg total) by mouth daily, Starting Sun 3/2/2025, Normal           CONTINUE these medications which have NOT CHANGED    Details   brompheniramine-pseudoephedrine-DM 30-2-10 MG/5ML syrup Take 10 mL by mouth 3 (three) times a day as needed for cough or congestion, Starting Sat 4/20/2024, Normal           No discharge procedures on file.  ED SEPSIS DOCUMENTATION   Time reflects when diagnosis was documented in both MDM as applicable and the Disposition within this note       Time User Action Codes Description Comment    3/2/2025  4:21 PM Augustina Zamudio Add [R10.9] Abdominal pain                  Augustina Zamudio MD  03/02/25 1988

## 2025-03-02 NOTE — ED NOTES
Patient ambulating to the bathroom with steady gait to provide urine sample.     Casey Schoener, RN  03/02/25 3889

## 2025-03-05 LAB
ATRIAL RATE: 61 BPM
P AXIS: 61 DEGREES
PR INTERVAL: 190 MS
QRS AXIS: 36 DEGREES
QRSD INTERVAL: 98 MS
QT INTERVAL: 408 MS
QTC INTERVAL: 411 MS
T WAVE AXIS: 37 DEGREES
VENTRICULAR RATE: 61 BPM

## 2025-03-05 PROCEDURE — 93010 ELECTROCARDIOGRAM REPORT: CPT | Performed by: INTERNAL MEDICINE
